# Patient Record
Sex: FEMALE | Race: WHITE
[De-identification: names, ages, dates, MRNs, and addresses within clinical notes are randomized per-mention and may not be internally consistent; named-entity substitution may affect disease eponyms.]

---

## 2020-10-01 ENCOUNTER — HOSPITAL ENCOUNTER (EMERGENCY)
Dept: HOSPITAL 92 - ERS | Age: 20
Discharge: HOME | End: 2020-10-01
Payer: COMMERCIAL
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
## 2020-10-01 DIAGNOSIS — S92.511A: Primary | ICD-10-CM

## 2020-10-01 NOTE — RAD
XR Toe(s) Rt Min 2 View



HISTORY: Injury, right toe pain



FINDINGS:

There is incompletely displaced fracture involving the shaft of the proximal phalanx of the fifth dig
it of the right foot



Reported By: Clinton Thorpe 

Electronically Signed:  10/1/2020 9:00 PM